# Patient Record
Sex: FEMALE | Race: WHITE
[De-identification: names, ages, dates, MRNs, and addresses within clinical notes are randomized per-mention and may not be internally consistent; named-entity substitution may affect disease eponyms.]

---

## 2017-05-19 ENCOUNTER — HOSPITAL ENCOUNTER (EMERGENCY)
Dept: HOSPITAL 33 - ED | Age: 77
Discharge: HOME | End: 2017-05-19
Payer: MEDICARE

## 2017-05-19 VITALS — HEART RATE: 78 BPM | DIASTOLIC BLOOD PRESSURE: 92 MMHG | SYSTOLIC BLOOD PRESSURE: 175 MMHG

## 2017-05-19 VITALS — OXYGEN SATURATION: 97 %

## 2017-05-19 DIAGNOSIS — F41.9: ICD-10-CM

## 2017-05-19 DIAGNOSIS — J44.9: ICD-10-CM

## 2017-05-19 DIAGNOSIS — Z79.899: ICD-10-CM

## 2017-05-19 DIAGNOSIS — K59.00: ICD-10-CM

## 2017-05-19 DIAGNOSIS — I10: Primary | ICD-10-CM

## 2017-05-19 LAB
ANION GAP SERPL CALC-SCNC: 10.4 MEQ/L (ref 5–15)
BASOPHILS NFR BLD AUTO: 0.2 % (ref 0–0.4)
BUN SERPL-MCNC: 18 MG/DL (ref 9–20)
CHLORIDE SERPL-SCNC: 106 MEQ/L (ref 98–107)
CO2 SERPL-SCNC: 27.9 MEQ/L (ref 21–32)
GLUCOSE SERPL-MCNC: 100 MG/DL (ref 70–110)
MCH RBC QN AUTO: 31.2 PG (ref 26–32)
NEUTROPHILS NFR BLD AUTO: 74.1 % (ref 36–66)
PLATELET # BLD AUTO: 143 K/MM3 (ref 150–450)
POTASSIUM SERPLBLD-SCNC: 3.7 MEQ/L (ref 3.5–5.1)
RBC # BLD AUTO: 4.2 M/MM3 (ref 4.1–5.4)
SODIUM SERPL-SCNC: 141 MEQ/L (ref 136–145)
WBC # BLD AUTO: 4.9 K/MM3 (ref 4–10.5)

## 2017-05-19 PROCEDURE — 93041 RHYTHM ECG TRACING: CPT

## 2017-05-19 PROCEDURE — 80048 BASIC METABOLIC PNL TOTAL CA: CPT

## 2017-05-19 PROCEDURE — 99284 EMERGENCY DEPT VISIT MOD MDM: CPT

## 2017-05-19 PROCEDURE — 36415 COLL VENOUS BLD VENIPUNCTURE: CPT

## 2017-05-19 PROCEDURE — 85025 COMPLETE CBC W/AUTO DIFF WBC: CPT

## 2017-05-19 PROCEDURE — 93005 ELECTROCARDIOGRAM TRACING: CPT

## 2017-05-19 NOTE — ERPHSYRPT
- History of Present Illness


Time Seen by Provider: 05/19/17 11:52


Source: patient, family


Exam Limitations: no limitations


Patient Subjective Stated Complaint: hypertension this morning


Triage Nursing Assessment: pt states she took bp and bp pill at same time this 

morning and bp was slightly elevated and then pt got an upsettign phone call 

and rechecked bp and it was elevated to diastolic over 105. pt c/o lt head 

pain. pt states she is 'worry wart and sometimes i do this but i dont want to 

have a stroke'


Physician History: 





patient concerned about flucuating BP over the past few days; sometimes too low 

and sometimes too high when gets upset; no HA or new visual changes; no 

dizziness; BP meds changed a few months ago; doesn't like feelings but hasn't 

told her MD; has appt in June; no palpatations or chest pain or sob


Timing/Duration: today, intermittent


Severity: moderate


Modifying Factors: Improves With: medication, other (stress)


Associated Symptoms: denies symptoms


Allergies/Adverse Reactions: 








metronidazole [From Flagyl] Allergy (Verified 05/19/17 11:53)


 


 hallucinations 





Home Medications: 








Amlodipine Besylate [Norvasc] 5 mg PO DAILY 01/05/16 [History]


Diazepam 2 mg PO TID 05/19/17 [History]


Lisinopril 20 mg*** [Zestril 20 MG***] 20 mg PO HS 05/19/17 [History]





Hx Tetanus, Diphtheria Vaccination/Date Given: Yes


Hx Influenza Vaccination/Date Given: No


Hx Pneumococcal Vaccination/Date Given: No


Immunizations Up to Date: Yes





- Review of Systems


Constitutional: No Symptoms


Eyes: No Symptoms


Ears, Nose, & Throat: No Symptoms


Respiratory: No Cough, No Dyspnea, No Wheezing


Cardiac: No Chest Pain, No Palpitations, No Syncope


Abdominal/Gastrointestinal: Constipation, No Abdominal Pain, No Nausea, No 

Vomiting, No Diarrhea


Genitourinary Symptoms: No Symptoms


Musculoskeletal: Arthralgias, No Fall, No Injury


Skin: No Symptoms


Neurological: No Symptoms


Psychological: Anxiety, No Alcohol Abuse, No Drug Abuse, No Suicidal Ideations


Endocrine: No Symptoms


Hematologic/Lymphatic: No Symptoms


Immunological/Allergic: No Symptoms





- Past Medical History


Pertinent Past Medical History: Yes


Neurological History: No Pertinent History


ENT History: Cataracts


Cardiac History: Hypertension


Respiratory History: COPD


Endocrine Medical History: No Pertinent History, Other


Musculoskeletal History: Fractures


GI Medical History: No Pertinent History


 History: No Pertinent History


Psycho-Social History: No Pertinent History


Female Reproductive Disorders: No Pertinent History


Other Medical History: aortic stenosis





- Past Surgical History


Past Surgical History: Yes


Neuro Surgical History: No Pertinent History


Cardiac: No Pertinent History


Respiratory: No Pertinent History


Gastrointestinal: Colon Resection, Other


Genitourinary: No Pertinent History


Musculoskeletal: No Pertinent History


Female Surgical History: No Pertinent History


Other Surgical History: 2007.REPAIR OF COLON PERFORATION, fatty tumor removed 

from under right arm





- Social History


Smoking Status: Never smoker


Exposure to second hand smoke: No


Alcohol Use: None


Drug Use: none


Patient Lives Alone: Yes


Significant Family History: no pertinent family hx





- Female History


Hx Pregnant Now: No





- Nursing Vital Signs


Nursing Vital Signs: 


 Initial Vital Signs











Temperature                    97.9 F


 


Temperature Source             Oral


 


Pulse Rate                     79


 


Respiratory Rate               18


 


Blood Pressure [Right Arm]     163/89


 


Pain Intensity                 2

















- Physical Exam


General Appearance: mild distress, alert, anxiety, thin


Eye Exam: PERRL/EOMI, eyes nml inspection, other (hypertensive changes of fundi

; no papiledema), No photophobia


Ears, Nose, Throat Exam: normal ENT inspection, TMs normal, pharynx normal, 

moist mucous membranes


Neck Exam: normal inspection, non-tender, supple, full range of motion, carotid 

bruit (bilateral referred from heart), No meningismus, No Brudzinski, No JVD


Respiratory Exam: normal breath sounds, lungs clear, airway intact, No chest 

tenderness, No respiratory distress


Cardiovascular Exam: regular rate/rhythm, normal heart sounds, normal 

peripheral pulses, murmur (4/6 blowing TEMITOPE referred to carotids bilateral), 

capillary refill <2 sec, No friction rub


Gastrointestinal/Abdomen Exam: soft, normal bowel sounds, No tenderness, No 

distention, No guarding, No pulsatile mass, No rebound, No organomegaly


Pelvic Exam: not done


Rectal Exam: deferred


Back Exam: normal inspection, normal range of motion, No CVA tenderness, No 

vertebral tenderness


Extremity Exam: normal inspection, normal range of motion, No kevin's sign, No 

pedal edema


Neurologic Exam: alert, oriented x 3, cooperative, CNs II-XII nml as tested, 

normal mood/affect, nml cerebellar function, nml station & gait


Skin Exam: normal color, warm, dry, No rash, No petechiae


**SpO2 Interpretation**: normal


SpO2: 97


Oxygen Delivery: Room Air





- Course


Nursing assessment & vital signs reviewed: Yes


EKG Interpreted by Me: RATE (81), Sinus Rhythm, NORMAL AXIS, NORMAL INTERVALS, 

NORMAL QRS, NORMAL ST-T


Rhythm Strip: Rate (82), Normal Sinus Rhythm


Ordered Tests: 


 Active Orders 24 hr











 Category Date Time Status


 


 Cardiac Monitor STAT Care  05/19/17 12:05 Active


 


 EKG-ER Only STAT Care  05/19/17 12:05 Active


 


 Pulse Oximetry (ED) STAT Care  05/19/17 12:05 Active


 


 Re-Check Vital Signs STAT Care  05/19/17 12:05 Completed


 


 BMP Stat Lab  05/19/17 12:15 Completed


 


 CBC W DIFF Stat Lab  05/19/17 12:15 Completed











Lab/Rad Data: 


 Laboratory Result Diagrams





 05/19/17 12:15 





 05/19/17 12:15 





 Laboratory Results











  05/19/17 05/19/17 Range/Units





  12:15 12:15 


 


WBC   4.9  (4.0-10.5)  K/mm3


 


RBC   4.20  (4.1-5.4)  M/mm3


 


Hgb   13.1  (12.0-16.0)  gm/dl


 


Hct   41.1  (35-47)  %


 


MCV   97.9  ()  fl


 


MCH   31.2  (26-32)  pg


 


MCHC   31.9 L  (32-36)  g/dl


 


RDW   12.7  (11.5-14.0)  %


 


Plt Count   143 L  (150-450)  K/mm3


 


MPV   10.2 H  (6-9.5)  fl


 


Gran %   74.1 H  (36.0-66.0)  %


 


Lymphocytes %   15.2 L  (24.0-44.0)  %


 


Monocytes %   9.9  (0.0-12.0)  %


 


Eosinophils %   0.6  (0.00-5.0)  %


 


Basophils %   0.2  (0.0-0.4)  %


 


Basophils #   0.01  (0-0.4)  


 


Sodium  141   (136-145)  mEq/L


 


Potassium  3.7   (3.5-5.1)  mEq/L


 


Chloride  106   ()  mEq/L


 


Carbon Dioxide  27.9   (21-32)  mEq/L


 


Anion Gap  10.4   (5-15)  MEQ/L


 


BUN  18   (9-20)  mg/dL


 


Creatinine  0.81   (0.55-1.30)  mg/dl


 


Estimated GFR  > 60   ML/MIN


 


Glucose  100   ()  MG/DL


 


Calcium  9.4   (8.5-10.1)  mg/dL














- Progress


Progress: improved (on recheck), re-examined (after 30 min)


Progress Note: 





05/19/17 12:22


will get EKG and labs and recheck BP; family at bedside


05/19/17 12:43


CBC wnl; EKG wnl; rechecked VS and improving; patient feels and looks better; 

other labs pending; will observe and recheck


05/19/17 12:53


rechecked and patient and VS continue to improve; labs ok; discussed treatment 

plan and instructions; son-in-law at bedside


Counseled pt/family regarding: lab results, diagnosis, need for follow-up





- Departure


Time of Disposition: 12:54


Departure Disposition: Home


Clinical Impression: 


 Hypertension, Anxiety about health





Condition: Stable


Critical Care Time: No


Referrals: 


RODY STREET MD [Primary Care Provider] - 


Instructions:  Hypertension


Additional Instructions: 


rest


 take meds; call LMD and or Cardiologist for BP and meds recheck


Follow-up with family doctor as directed. Call for appointment.  Return if any 

problems. If you smoke please stop. Call or follow up with your family doctor 

for assistance if you need it to stop.  Please wear your seatbelt when driving. 

Have a nice day.


Thank you for allowing us to participate in your care today.





:o)





Dr Bipin Cerna

## 2017-06-04 ENCOUNTER — HOSPITAL ENCOUNTER (EMERGENCY)
Dept: HOSPITAL 33 - ED | Age: 77
Discharge: HOME | End: 2017-06-04
Payer: MEDICARE

## 2017-06-04 VITALS — DIASTOLIC BLOOD PRESSURE: 80 MMHG | HEART RATE: 79 BPM | SYSTOLIC BLOOD PRESSURE: 157 MMHG | OXYGEN SATURATION: 87 %

## 2017-06-04 DIAGNOSIS — S22.42XA: Primary | ICD-10-CM
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
## 2017-06-04 PROCEDURE — 71100 X-RAY EXAM RIBS UNI 2 VIEWS: CPT

## 2017-06-04 PROCEDURE — 99284 EMERGENCY DEPT VISIT MOD MDM: CPT

## 2017-06-04 PROCEDURE — 99283 EMERGENCY DEPT VISIT LOW MDM: CPT

## 2017-06-04 PROCEDURE — 71020: CPT

## 2017-06-04 PROCEDURE — 73030 X-RAY EXAM OF SHOULDER: CPT

## 2017-06-04 NOTE — ERPHSYRPT
- History of Present Illness


Time Seen by Provider: 06/04/17 13:40


Source: patient


Exam Limitations: clinical condition


Patient Subjective Stated Complaint: fall--0900


Triage Nursing Assessment: fell putting clothes on this morning at 0900. bruise 

noted to inner rt and lt elbow. lt breast bruise. pain to lt scapula area. pain 

with breathing and palpation. denies loc.


Physician History: 





PATIENT WHILE REMOVING HER CLOTHES THIS AM TRIPPED AND FELL TO FLOOR SUSTAINED 

LEFT CHEST WALL INJURY, HAS LEFT SHOULDER BLADE PAIN.  DENIES HEAD, NECK OR 

BACK INJURY





Occurred: this morning


Reason for Fall: tripped


Injuries/Pain Location: chest


Loss of Consciousness: no loss of consciousness


Quality: stabbing


Severity of Pain-Max: moderate


Severity of Pain-Current: moderate


Allergies/Adverse Reactions: 








metronidazole [From Flagyl] Allergy (Verified 06/04/17 13:39)


 


 hallucinations 





Home Medications: 








Amlodipine Besylate [Norvasc] 5 mg PO DAILY 01/05/16 [History]


Diazepam 2 mg PO TID 05/19/17 [History]


Lisinopril 20 mg*** [Zestril 20 MG***] 20 mg PO HS 05/19/17 [History]





Hx Tetanus, Diphtheria Vaccination/Date Given: Yes


Hx Influenza Vaccination/Date Given: No


Hx Pneumococcal Vaccination/Date Given: No





- Review of Systems


Constitutional: No Symptoms


Respiratory: No Cough, No Dyspnea


Cardiac: Chest Pain


Genitourinary Symptoms: No Dysuria


Musculoskeletal: Back Pain, Injury


Neurological: No Dizziness, No Focal Weakness, No Sensory Changes





- Past Medical History


Pertinent Past Medical History: Yes


Neurological History: No Pertinent History


ENT History: Cataracts


Cardiac History: Hypertension


Respiratory History: COPD


Endocrine Medical History: No Pertinent History, Other


Musculoskeletal History: Fractures


GI Medical History: No Pertinent History


 History: No Pertinent History


Psycho-Social History: No Pertinent History


Female Reproductive Disorders: No Pertinent History


Other Medical History: aortic stenosis





- Past Surgical History


Past Surgical History: Yes


Neuro Surgical History: No Pertinent History


Cardiac: No Pertinent History


Respiratory: No Pertinent History


Gastrointestinal: Colon Resection, Other


Genitourinary: No Pertinent History


Musculoskeletal: No Pertinent History


Female Surgical History: No Pertinent History


Other Surgical History: 2007.REPAIR OF COLON PERFORATION, fatty tumor removed 

from under right arm





- Social History


Smoking Status: Never smoker


Exposure to second hand smoke: No


Alcohol Use: None


Drug Use: none


Patient Lives Alone: Yes


Significant Family History: no pertinent family hx





- Female History


Hx Pregnant Now: No





- Nursing Vital Signs


Nursing Vital Signs: 


 Initial Vital Signs











Temperature                    97.8 F


 


Temperature Source             Oral


 


Pulse Rate                     88


 


Respiratory Rate               18


 


Blood Pressure [Right Arm]     176/83


 


Pain Intensity                 6

















- Physical Exam


General Appearance: no apparent distress, alert


Head Injury: no evidence of injury


Eye Exam: PERRL/EOMI


ENT Exam: airway nml


Neck Exam: supple, full range of motion, normal inspection, other (NONTENDER), 

No tenderness


Respiratory/Chest Exam: chest tenderness (LEFT MID 5TH TO 8TH RIBS, NO 

ECCHYMOSIS, NO CREPITUS), normal breath sounds, No respiratory distress


Cardiovascular Exam: normal heart sounds, regular rate/rhythm


Gastrointestinal Exam: soft, No tenderness, No distention, No guarding, No 

ecchymosis


Back Exam: normal inspection, other (TENDERNESS LEFT MID SCAPULA NO ECCHMOSIS), 

No vertebral tenderness


Extremity Exam: normal inspection, normal range of motion, pelvis stable, No 

deformities


Peripheral Pulses: carotid (R): 2+, carotid (L): 2+, femoral (R): 2+, femoral (L

): 2+, dorsalis-pedis (R): 2+, dorsalis-pedis (L): 2+


Neurologic Exam: alert, oriented x 3, cooperative, sensation nml, No motor 

deficits


Skin Exam: normal color, warm, dry


**SpO2 Interpretation**: normal


SpO2: 98





- Radiology Exams


  ** Chest


X-ray Interpretation: Interpreted by me, Negative





  ** Left Ribs


X-ray Interpretation: Interpreted by me, Other (MINIMAL DISPLACED LEFT 7TH, 8TH 

RIB FRACTURE)


Ordered Tests: 


 Active Orders 24 hr











 Category Date Time Status


 


 CHEST 2 VIEWS (PA AND LAT) Stat Exams  06/04/17 13:58 Taken


 


 RIBS UNILATERAL Stat Exams  06/04/17 13:58 Taken


 


 SHOULDER Stat Exams  06/04/17 13:59 Taken








Medication Summary














Discontinued Medications














Generic Name Dose Route Start Last Admin





  Trade Name Scot  PRN Reason Stop Dose Admin


 


Acetaminophen  650 mg  06/04/17 13:57  06/04/17 14:02





  Tylenol 325 Mg***  PO  06/04/17 13:58  650 mg





  STAT STA   Administration


 


Acetaminophen  Confirm  06/04/17 14:02  





  Tylenol 325 Mg***  Administered  06/04/17 14:03  





  Dose   





  650 mg   





  .ROUTE   





  .STK-MED ONE   














- Progress


Progress Note: 





06/04/17 15:45


REFUSED OPIATES, GIVEN TYLENOL 650MG ORALLY





Counseled pt/family regarding: diagnosis, need for follow-up, rad results





- Departure


Time of Disposition: 15:50


Departure Disposition: Home


Clinical Impression: 


 LEFT 7TH 8TH RIB FRACTURES





Condition: Stable


Critical Care Time: No


Additional Instructions: 


FOLLOWUP WITH YOUR FAMILY PHYSICIAN IN 1 WEEK. CONTINUE TYLENOL FOR PAIN MID TO 

MODERATE, OR 1/2 TABLET OF TYLENOL #3 EVERY 4-8 HOURS AS NEEDED FOR SEVERE 

PAIN. RETURN TO EMERGENCY FOR DIFFICULTY BREATHING OR INCREASING PAIN.


Prescriptions: 


Codeine Phosphate/APAP #3** [Tylenol #3 Tablet**] 1 tab PO Q6HPRN PRN #15 tablet


 PRN Reason: Pain

## 2017-06-04 NOTE — XRAY
Indication: Pain following fall.



Comparison: None



3 views of the left shoulder demonstrates osteopenia and humeral head

osteophyte.  No other bony, articular, or soft tissue abnormalities.

## 2017-06-04 NOTE — XRAY
Indication: Pain following fall.



Comparison: None



2 views of the left ribs demonstrate nondisplaced lateral 6th/7th rib

fractures and osteopenia.  No underlying pneumothorax/hemothorax.

## 2017-06-04 NOTE — XRAY
Indication: Left-sided pain following fall.



Comparison: March 1, 2007.



PA/lateral chest again hyperinflated and clear.  Heart and mediastinal

structures are stable and within normal limits.  Bony thorax intact again with

mild osteopenia and remote T6/T8 compression fractures.



Left ribs reported separately.



Impression: Stable nonacute hyperinflated chest with chronic findings.

## 2022-12-11 ENCOUNTER — HOSPITAL ENCOUNTER (EMERGENCY)
Dept: HOSPITAL 33 - ED | Age: 82
LOS: 1 days | Discharge: HOME | End: 2022-12-12
Payer: MEDICARE

## 2022-12-11 DIAGNOSIS — Z79.899: ICD-10-CM

## 2022-12-11 DIAGNOSIS — I10: ICD-10-CM

## 2022-12-11 DIAGNOSIS — M54.50: ICD-10-CM

## 2022-12-11 DIAGNOSIS — Z79.891: ICD-10-CM

## 2022-12-11 DIAGNOSIS — M48.56XA: Primary | ICD-10-CM

## 2022-12-11 PROCEDURE — 81015 MICROSCOPIC EXAM OF URINE: CPT

## 2022-12-11 PROCEDURE — 74176 CT ABD & PELVIS W/O CONTRAST: CPT

## 2022-12-11 PROCEDURE — 96374 THER/PROPH/DIAG INJ IV PUSH: CPT

## 2022-12-11 PROCEDURE — 36000 PLACE NEEDLE IN VEIN: CPT

## 2022-12-11 PROCEDURE — 87086 URINE CULTURE/COLONY COUNT: CPT

## 2022-12-11 PROCEDURE — 99284 EMERGENCY DEPT VISIT MOD MDM: CPT

## 2022-12-11 NOTE — ERPHSYRPT
- History of Present Illness


Time Seen by Provider: 12/11/22 22:10


Source: patient, EMS


Exam Limitations: no limitations


Physician History: 





This is 82-year-old white female patient has chronic back pain issues and known 

history of spinal compression fractures who states in the last week, and 

specifically since Friday, he has had worsening back pain.  However, per patient

report, she only takes Tylenol for her pain control.  She did not fall and did 

not have any new trauma to the area.  Patient arrives via ambulance service 

without much back pain complaints at all.  Patient received intravenous fentanyl

in route to the hospital.  Patient has a history of hypertension.  She has no 

loss of bowel or bladder control.  She is not numb in her feet.  Her pain in the

lower back is keeping her from moving well on her own.


Method of Injury: other (No injury)


Quality: sharp


Back Pain Location: lumbar spine


Severity of Pain-Max: moderate


Severity of Pain-Current: none


Modifying Factors: Improves With: movement


Associated Symptoms: lower back pain, No urinary incontinence, No loss of bowel 

control, No problems urinating, No numbness in legs/feet


Previous symptoms: same symptoms as today


Allergies/Adverse Reactions: 








iodine Allergy (Intermediate, Verified 12/11/22 22:32)


   


   IV contrast dye 


cephalexin [From Keflex] Allergy (Verified 12/11/22 22:32)


   


metronidazole [From Flagyl] Allergy (Verified 12/11/22 22:32)


   


   hallucinations 





Home Medications: 








Amlodipine Besylate [Norvasc] 5 mg PO DAILY 01/05/16 [History]


Losartan Potassium 25 mg PO BID 12/11/22 [History]





Hx Tetanus, Diphtheria Vaccination/Date Given: Yes


Hx Influenza Vaccination/Date Given: No


Hx Pneumococcal Vaccination/Date Given: No





Travel Risk





- International Travel


Have you traveled outside of the country in past 3 weeks: No





- Coronavirus Screening


Are you exhibiting any of the following symptoms?: No


Close contact with a COVID-19 positive Pt in past 14-21 Days: No





- Review of Systems


Constitutional: No Symptoms


Eyes: No Symptoms


Ears, Nose, & Throat: No Symptoms


Respiratory: No Symptoms


Cardiac: No Symptoms


Abdominal/Gastrointestinal: No Symptoms


Genitourinary Symptoms: No Symptoms


Musculoskeletal: Back Pain, No Fall, No Injury


Skin: No Symptoms


Neurological: No Symptoms


Psychological: No Symptoms


Endocrine: No Symptoms


Hematologic/Lymphatic: No Symptoms


Immunological/Allergic: No Symptoms


All Other Systems: Reviewed and Negative





- Past Medical History


Pertinent Past Medical History: Yes


Neurological History: No Pertinent History


ENT History: Cataracts


Cardiac History: Hypertension, Other


Respiratory History: No Pertinent History


Endocrine Medical History: No Pertinent History


Musculoskeletal History: Fractures, Osteoporosis


GI Medical History: No Pertinent History


 History: No Pertinent History


Psycho-Social History: No Pertinent History


Female Reproductive Disorders: No Pertinent History


Other Medical History: PT. HAS AORTIC STENOSIS THAT WILL LIKELY REQUIRE SX SOON.

 HX SPINAL COMPRESSION FX.  2007 COLONSCOPY AND BOWEL PERF W/ TEMP COLOSTOMY X 4

 MOS.  REPORTS SHE HAS NOT HAD PELVIC EXAM FOR 5+ YEARS.  LAST PHYSICAL EXAM 

08/09/22.





- Past Surgical History


Past Surgical History: Yes


Neuro Surgical History: No Pertinent History


Cardiac: No Pertinent History


Respiratory: No Pertinent History


Gastrointestinal: Colon Resection, Other


Genitourinary: No Pertinent History


Musculoskeletal: No Pertinent History


Female Surgical History: No Pertinent History


Other Surgical History: 2007.REPAIR OF COLON PERFORATION, fatty tumor removed 

from under right arm





- Social History


Smoking Status: Never smoker


Exposure to second hand smoke: No


Alcohol Use: None


Drug Use: none


Patient Lives Alone: Yes


Significant Family History: no pertinent family hx





- Nursing Vital Signs


Nursing Vital Signs: 


                               Initial Vital Signs











Temperature  98.6 F   12/11/22 22:17


 


Pulse Rate  90   12/11/22 22:17


 


Respiratory Rate  20   12/11/22 22:17


 


Blood Pressure  174/79   12/11/22 22:17


 


O2 Sat by Pulse Oximetry  96   12/11/22 22:17








                                   Pain Scale











Pain Intensity [Back]          2


 


Pain Intensity                 2

















- Physical Exam


General Appearance: no apparent distress, alert, anxiety, thin


Eye Exam: PERRL/EOMI, eyes nml inspection


Ears, Nose, Throat Exam: normal ENT inspection, TM abnormal (L)


Neck Exam: normal inspection, non-tender, supple, full range of motion


Respiratory Exam: normal breath sounds, lungs clear, airway intact, No chest 

tenderness, No respiratory distress


Cardiovascular Exam: regular rate/rhythm, normal heart sounds, normal peripheral

 pulses


Pelvic Exam: not done


Rectal Exam: not done


Back Exam: normal inspection, normal range of motion, No CVA tenderness


Extremity Exam: normal inspection, normal range of motion, pelvis stable


Neurologic Exam: alert, oriented x 3, cooperative, CNs II-XII nml as tested, 

normal mood/affect


Skin Exam: normal color, warm, dry


Lymphatic Exam: No adenopathy


**SpO2 Interpretation**: normal


O2 Delivery: Room Air





- Course


Nursing assessment & vital signs reviewed: Yes


Ordered Tests: 


                               Active Orders 24 hr











 Category Date Time Status


 


 ABDOMEN AND PELVIS W/0 CONTRAS [CT] Stat Exams  12/11/22 22:32 Taken














- Progress


Progress: improved, pain not gone completely


Progress Note: 





12/11/22 23:44


CAT scan of the abdomen and pelvis without contrast shows mild to moderate 

dextroscoliosis and L2 compression fracture that is present.  There is no 

evidence of abdominal aortic aneurysm.  Radiologist reports that there is no 

evidence of acute intra-abdominal or pelvic pathology.  The additional findings 

in the body of the report are nonemergent per his evaluation.


Counseled pt/family regarding: diagnosis, need for follow-up, rad results





- Departure


Departure Disposition: Home


Clinical Impression: 


 Back pain, Compression fracture of L2





Condition: Stable


Critical Care Time: No


Referrals: 


RODY STREET MD [Primary Care Provider] - Follow up/PCP as directed


Additional Instructions: 


Take the medicine as prescribed.  Follow-up with your primary care provider, 

pain specialist or back specialist for further evaluation and management of your

spine and pain control


Prescriptions: 


Hydrocodone/APAP 5/325 [Norco 5/325 mg***] 1 each PO Q12H PRN PRN #6 tablet MDD 

2


 PRN Reason: Pain

## 2022-12-12 VITALS — OXYGEN SATURATION: 96 %

## 2022-12-12 VITALS — DIASTOLIC BLOOD PRESSURE: 71 MMHG | SYSTOLIC BLOOD PRESSURE: 145 MMHG

## 2022-12-12 VITALS — HEART RATE: 94 BPM

## 2022-12-12 LAB
GLUCOSE UR-MCNC: NEGATIVE MG/DL
PROT UR STRIP-MCNC: 30 MG/DL
RBC # UR AUTO: (no result) ERY/UL (ref 0–5)
UA DIPSTICK PNL UR: (no result)
URINE CULTURED INDICATED?: YES
WBC #/AREA URNS HPF: (no result) /HPF (ref 0–5)

## 2022-12-12 NOTE — XRAY
Indication: Abdomen pain, bloating, and constipation.



Multiple contiguous axial images obtained through the abdomen and pelvis

without contrast.



Comparison: [place] 10, 2020



Lung bases demonstrates new minimal right effusion with right base compressive

atelectasis.  Stable bibasilar subsegmental fibrosis/scarring.  Heart not

enlarged.



Noncontrasted stomach and bowel loops are nonobstructed.  Mild fecal debris

predominantly in the ascending colon.  No free fluid/air.  Again tiny

gallstones without abnormal biliary distention and 90 mm exophytic right upper

pole renal cyst.



Remaining liver, gallbladder, pancreas, spleen, adrenal glands, kidneys,

ureters, bladder, and uterus are unremarkable for noncontrast exam.  There

remains moderate scattered aortoiliac calcifications without AAA.



Osseous structures again demonstrates osteopenia, moderate degenerative

changes of the visualized lumbar spine, moderate dextrorotoscoliosis centered

at L1, and mild degenerative changes both hips.  New remote-appearing L2

superior endplate fracture with approximately 50% height loss.



Impression:

1.  New tiny right effusion/atelectasis.

2.  Again tiny gallstones, small right renal cyst, arteriosclerotic disease,

and chronic bony findings.

3.  Remaining CT abdomen/pelvis without contrast exam is negative.



Comment: Preliminary interpretation by [facility].  No critical discrepancy.